# Patient Record
Sex: MALE | Race: WHITE | Employment: FULL TIME | URBAN - METROPOLITAN AREA
[De-identification: names, ages, dates, MRNs, and addresses within clinical notes are randomized per-mention and may not be internally consistent; named-entity substitution may affect disease eponyms.]

---

## 2017-04-24 ENCOUNTER — OFFICE VISIT (OUTPATIENT)
Dept: OPHTHALMOLOGY | Facility: CLINIC | Age: 62
End: 2017-04-24

## 2017-04-24 DIAGNOSIS — H40.009 GLAUCOMA SUSPECT, UNSPECIFIED LATERALITY: ICD-10-CM

## 2017-04-24 DIAGNOSIS — H52.13 MYOPIA, BILATERAL: Primary | ICD-10-CM

## 2017-04-24 ASSESSMENT — REFRACTION_MANIFEST
OS_CYLINDER: +0.50
OS_AXIS: 010
OD_CYLINDER: +1.00
OD_AXIS: 020
OD_SPHERE: -1.75
OS_SPHERE: -1.00

## 2017-04-24 ASSESSMENT — PACHYMETRY
OS_CT(UM): 560
OD_CT(UM): 561
EXAM_DATE: 2008

## 2017-04-24 ASSESSMENT — VISUAL ACUITY
OD_CC: 20/20
OD_SC: J1
CORRECTION_TYPE: GLASSES
OS_SC: J1
METHOD: SNELLEN - LINEAR
OS_CC: 20/15

## 2017-04-24 ASSESSMENT — EXTERNAL EXAM - LEFT EYE: OS_EXAM: NORMAL

## 2017-04-24 ASSESSMENT — REFRACTION_WEARINGRX
OD_SPHERE: -1.75
OS_SPHERE: -1.00
SPECS_TYPE: SVL
OS_AXIS: 009
OD_AXIS: 015
OS_CYLINDER: +0.50
OD_CYLINDER: +1.00

## 2017-04-24 ASSESSMENT — EXTERNAL EXAM - RIGHT EYE: OD_EXAM: NORMAL

## 2017-04-24 ASSESSMENT — CUP TO DISC RATIO
OS_RATIO: 0.65
OD_RATIO: 0.65

## 2017-04-24 ASSESSMENT — SLIT LAMP EXAM - LIDS
COMMENTS: NORMAL
COMMENTS: NORMAL

## 2017-04-24 ASSESSMENT — CONF VISUAL FIELD
OD_NORMAL: 1
OS_NORMAL: 1

## 2017-04-24 ASSESSMENT — TONOMETRY
IOP_METHOD: APPLANATION
OD_IOP_MMHG: 12
OS_IOP_MMHG: 12

## 2017-04-24 NOTE — NURSING NOTE
Chief Complaints and History of Present Illnesses   Patient presents with     COMPREHENSIVE EYE EXAM     Yearly visit     HPI    Affected eye(s):  Both   Symptoms:     No blurred vision   No difficulty with reading   No difficulty with driving   No eye discharge      Duration:  1 year   Frequency:  Constant       Do you have eye pain now?:  No      Comments:  Yearly routine exam.  No eye meds.  No concerns per pt.    Tamar DUNN 8:41 AM 04/24/2017

## 2017-04-24 NOTE — PROGRESS NOTES
Assessment & Plan      Andrew Pablo is a 61 year old male with the following diagnoses:   (H52.13) Myopia, bilateral  (primary encounter diagnosis)  Comment: No change  Plan: Copy Rx    (H40.009) Glaucoma suspect, unspecified laterality - Both Eyes  Comment: Stable  Plan: Follow     -----------------------------------------------------------------------------------      Patient disposition:   Return in about 6 months (around 10/24/2017) for OCT, Follow Up. or sooner as needed.    Complete documentation of historical and exam elements from today's encounter can  be found in the full encounter summary report (not reduplicated in this progress  note). I personally obtained the chief complaint(s) and history of present illness. I  confirmed and edited as necessary the review of systems, past medical/surgical  history, family history, social history, and examination findings as documented by  others; and I examined the patient myself. I personally reviewed the relevant tests,  images, and reports as documented above. I formulated and edited as necessary the  assessment and plan and discussed the findings and management plan with the  patient and family.    DIANNE Mccarthy M.D

## 2017-10-23 ENCOUNTER — OFFICE VISIT (OUTPATIENT)
Dept: OPHTHALMOLOGY | Facility: CLINIC | Age: 62
End: 2017-10-23

## 2017-10-23 DIAGNOSIS — H40.009 GLAUCOMA SUSPECT, UNSPECIFIED LATERALITY: Primary | ICD-10-CM

## 2017-10-23 ASSESSMENT — VISUAL ACUITY
OS_CC: 20/15
METHOD: SNELLEN - LINEAR
OD_CC: J1+
OD_CC: 20/15
OS_CC: J1+

## 2017-10-23 ASSESSMENT — CUP TO DISC RATIO
OD_RATIO: 0.65
OS_RATIO: 0.65

## 2017-10-23 ASSESSMENT — REFRACTION_WEARINGRX
OS_CYLINDER: +0.50
OD_CYLINDER: +1.00
OD_SPHERE: -1.75
OS_SPHERE: -1.00
SPECS_TYPE: SVL
OD_AXIS: 015
OS_AXIS: 009

## 2017-10-23 ASSESSMENT — SLIT LAMP EXAM - LIDS
COMMENTS: NORMAL
COMMENTS: NORMAL

## 2017-10-23 ASSESSMENT — TONOMETRY
IOP_METHOD: APPLANATION
OD_IOP_MMHG: 13
OS_IOP_MMHG: 13

## 2017-10-23 ASSESSMENT — CONF VISUAL FIELD
OD_NORMAL: 1
OS_NORMAL: 1

## 2017-10-23 ASSESSMENT — EXTERNAL EXAM - LEFT EYE: OS_EXAM: NORMAL

## 2017-10-23 ASSESSMENT — PACHYMETRY
OD_CT(UM): 561
EXAM_DATE: 2008
OS_CT(UM): 560

## 2017-10-23 ASSESSMENT — EXTERNAL EXAM - RIGHT EYE: OD_EXAM: NORMAL

## 2017-10-23 NOTE — NURSING NOTE
Chief Complaints and History of Present Illnesses   Patient presents with     Follow Up For     s/p Glaucoma suspect, unspecified laterality      HPI    Affected eye(s):  Both   Symptoms:     No blurred vision   No decreased vision   No floaters   No flashes      Duration:  6 months   Frequency:  Constant       Do you have eye pain now?:  No      Comments:  States va is the same since last visit vision stable over the last 6 months OU.  Mateo Ching  8:45 AM October 23, 2017

## 2017-10-23 NOTE — MR AVS SNAPSHOT
After Visit Summary   10/23/2017    Andrew Pablo    MRN: [id number]           Patient Information     Date Of Birth          1955        Visit Information        Provider Department      10/23/2017 8:40 AM Jose Antonio Mccarthy MD [facility]        Today's Diagnoses     Glaucoma suspect, unspecified laterality - Both Eyes    -  1       Follow-ups after your visit        Follow-up notes from your care team     Return in about 6 months (around 2018) for Complete Eye Exam.      Who to contact     Please call your clinic at [phone] to:    Ask questions about your health    Make or cancel appointments    Discuss your medicines    Learn about your test results    Speak to your doctor   If you have compliments or concerns about an experience at your clinic, or if you wish to file a complaint, please contact [facility] Patient Relations at [phone] or email us at [email or web address]         Additional Information About Your Visit        MyChart Information     [employer] is an electronic gateway that provides easy, online access to your medical records. With [employer], you can request a clinic appointment, read your test results, renew a prescription or communicate with your care team.     To sign up for [employer] visit the website at [email or web address]   You will be asked to enter the access code listed below, as well as some personal information. Please follow the directions to create your username and password.     Your access code is: [email or web address]  Expires: 2018  5:31 AM     Your access code will  in 90 days. If you need help or a new code, please contact your [facility] or call [phone] for assistance.        Care EveryWhere ID     This is your Care EveryWhere ID. This could be used by other organizations to access your [place] medical records  [id number]          Blood Pressure from Last 3 Encounters:   No data found for BP    Weight from Last 3 Encounters:   No data found for Wt              We Performed the Following     OCT Optic Nerve RNFL Spectralis OU (both eyes)        Primary Care Provider Office Phone # Fax #    Antoine Art [phone]       [facility] [address] [facility] [address]        Equal Access to Services     KANDACE CHASE : Hadii aad ku hadasho Soomaali, waaxda luqadaha, qaybta kaalmada adeegyada, waxanuj juddin hayaan adegregory lyonsdemiansridhar labertha . So [facility] [phone].    ATENCIÓN: Si habla español, tiene a dyer disposición servicios gratuitos de asistencia lingüística. Llame al [phone].    We comply with applicable federal civil rights laws and [place] laws. We do not discriminate on the basis of race, color, national origin, age, disability, sex, sexual orientation, or gender identity.            Thank you!     Thank you for choosing [facility]  for your care. Our goal is always to provide you with excellent care. Hearing back from our patients is one way we can continue to improve our services. Please take a few minutes to complete the written survey that you may receive in the mail after your visit with us. Thank you!             Your Updated Medication List - Protect others around you: Learn how to safely use, store and throw away your medicines at www.disposemymeds.org.          This list is accurate as of: 10/23/17 11:59 PM.  Always use your most recent med list.                   Brand Name Dispense Instructions for use Diagnosis    hydrochlorothiazide 12.5 MG capsule    MICROZIDE     Take 12.5 mg by mouth daily        LEVOTHYROXINE SODIUM PO           LISINOPRIL PO

## 2017-11-05 NOTE — PROGRESS NOTES
Assessment & Plan      Andrew Pablo is a 62 year old male with the following diagnoses:   (H40.009) Glaucoma suspect, unspecified laterality - Both Eyes  (primary encounter diagnosis)  Comment: Stable  Plan: OCT Optic Nerve RNFL Spectralis OU (both eyes)        Normal OU     -----------------------------------------------------------------------------------      Patient disposition:   Return in about 6 months (around 4/23/2018) for Complete Eye Exam. or sooner as needed.    Complete documentation of historical and exam elements from today's encounter can  be found in the full encounter summary report (not reduplicated in this progress  note). I personally obtained the chief complaint(s) and history of present illness. I  confirmed and edited as necessary the review of systems, past medical/surgical  history, family history, social history, and examination findings as documented by  others; and I examined the patient myself. I personally reviewed the relevant tests,  images, and reports as documented above. I formulated and edited as necessary the  assessment and plan and discussed the findings and management plan with the  patient and family.    DIANNE Mccarthy M.D

## 2018-04-09 ENCOUNTER — OFFICE VISIT (OUTPATIENT)
Dept: OPHTHALMOLOGY | Facility: CLINIC | Age: 63
End: 2018-04-09
Payer: COMMERCIAL

## 2018-04-09 DIAGNOSIS — H40.009 GLAUCOMA SUSPECT, UNSPECIFIED LATERALITY: Primary | ICD-10-CM

## 2018-04-09 DIAGNOSIS — B88.0 INFESTATION BY DEMODEX: ICD-10-CM

## 2018-04-09 DIAGNOSIS — H25.13 NUCLEAR SCLEROSIS OF BOTH EYES: ICD-10-CM

## 2018-04-09 RX ORDER — METOPROLOL TARTRATE 50 MG
75 TABLET ORAL
COMMUNITY
Start: 2018-01-04

## 2018-04-09 RX ORDER — LISINOPRIL AND HYDROCHLOROTHIAZIDE 20; 25 MG/1; MG/1
1 TABLET ORAL
COMMUNITY

## 2018-04-09 RX ORDER — ASPIRIN 325 MG
325 TABLET ORAL
COMMUNITY
End: 2018-04-09

## 2018-04-09 ASSESSMENT — TONOMETRY
IOP_METHOD: APPLANATION
OS_IOP_MMHG: 13
OD_IOP_MMHG: 14

## 2018-04-09 ASSESSMENT — EXTERNAL EXAM - RIGHT EYE: OD_EXAM: NORMAL

## 2018-04-09 ASSESSMENT — REFRACTION_WEARINGRX
OD_CYLINDER: +1.00
SPECS_TYPE: SVL
OS_AXIS: 010
OD_SPHERE: -1.75
OS_CYLINDER: +0.50
OS_SPHERE: -1.00
OD_AXIS: 020

## 2018-04-09 ASSESSMENT — VISUAL ACUITY
OS_CC: 20/20
METHOD_MR: NOT NEEDED
METHOD: SNELLEN - LINEAR
CORRECTION_TYPE: GLASSES
OD_CC: 20/20

## 2018-04-09 ASSESSMENT — CUP TO DISC RATIO
OD_RATIO: 0.7
OS_RATIO: 0.65

## 2018-04-09 ASSESSMENT — SLIT LAMP EXAM - LIDS
COMMENTS: NORMAL
COMMENTS: NORMAL

## 2018-04-09 ASSESSMENT — CONF VISUAL FIELD
OS_NORMAL: 1
OD_NORMAL: 1

## 2018-04-09 ASSESSMENT — EXTERNAL EXAM - LEFT EYE: OS_EXAM: NORMAL

## 2018-04-09 NOTE — NURSING NOTE
Chief Complaints and History of Present Illnesses   Patient presents with     Glaucoma Suspect Follow Up     HPI    Affected eye(s):  Both   Symptoms:        Duration:  6 months   Frequency:  Constant       Do you have eye pain now?:  No      Comments:  No eye medications including  OTC  No concerns or VA changes  Deanne DUNN 8:46 AM April 9, 2018

## 2018-04-09 NOTE — MR AVS SNAPSHOT
After Visit Summary   2018    Andrew Pablo    MRN: [id number]           Patient Information     Date Of Birth          1955        Visit Information        Provider Department      2018 8:30 AM Silver German, [facility]I [facility]        Today's Diagnoses     Glaucoma suspect, unspecified laterality - Both Eyes    -  1    Nuclear sclerosis of both eyes        Infestation by Demodex           Follow-ups after your visit        Follow-up notes from your care team     Return in about 1 year (around 2019) for Comprehensive Eye Exam.      Who to contact     Please call your clinic at [phone] to:    Ask questions about your health    Make or cancel appointments    Discuss your medicines    Learn about your test results    Speak to your doctor            Additional Information About Your Visit        MyChart Information     [employer] is an electronic gateway that provides easy, online access to your medical records. With [employer], you can request a clinic appointment, read your test results, renew a prescription or communicate with your care team.     To sign up for [employer] visit the website at [email or web address]   You will be asked to enter the access code listed below, as well as some personal information. Please follow the directions to create your username and password.     Your access code is: [email or web address]  Expires: 2018  6:31 AM     Your access code will  in 90 days. If you need help or a new code, please contact your [facility] or call [phone] for assistance.        Care EveryWhere ID     This is your Care EveryWhere ID. This could be used by other organizations to access your [place] medical records  [id number]         Blood Pressure from Last 3 Encounters:   No data found for BP    Weight from Last 3 Encounters:   No data found for Wt              Today, you had the following     No orders found  for display         Today's Medication Changes          These changes are accurate as of 4/9/18  9:17 AM.  If you have any questions, ask your nurse or doctor.               Stop taking these medicines if you haven't already. Please contact your care team if you have questions.     GOODSENSE ASPIRIN 325 MG tablet   Generic drug:  aspirin   Stopped by:  Silver German OD                    Primary Care Provider Office Phone # Fax #    Antoine Art [phone]       [place] [address] [address] [facility] [address]        Equal Access to Services     KANDACE CHASE : Hadii aad ku hadasho Soomaali, waaxda luqadaha, qaybta kaalmada adeegyada, waxay idiin hayaan adegregory kharash augustine . So [facility] [phone].    ATENCIÓN: Si habla español, tiene a dyer disposición servicios gratuitos de asistencia lingüística. [facility] [phone].    We comply with applicable federal civil rights laws and [place] laws. We do not discriminate on the basis of race, color, national origin, age, disability, sex, sexual orientation, or gender identity.            Thank you!     Thank you for choosing [facility]  for your care. Our goal is always to provide you with excellent care. Hearing back from our patients is one way we can continue to improve our services. Please take a few minutes to complete the written survey that you may receive in the mail after your visit with us. Thank you!             Your Updated Medication List - Protect others around you: Learn how to safely use, store and throw away your medicines at www.disposemymeds.org.          This list is accurate as of 4/9/18  9:17 AM.  Always use your most recent med list.                   Brand Name Dispense Instructions for use Diagnosis    FISH OIL PO      Take 2 capsules by mouth        LEVOTHYROXINE SODIUM PO           lisinopril-hydrochlorothiazide 20-25 MG per tablet    PRINZIDE/ZESTORETIC     Take 1 tablet by mouth        metoprolol  tartrate 50 MG tablet    LOPRESSOR     Take 75 mg by mouth        XARELTO PO

## 2018-04-09 NOTE — PROGRESS NOTES
Assessment/Plan  (H40.009) Glaucoma suspect, unspecified laterality - Both Eyes  (primary encounter diagnosis)  Comment: Appears stable to previous visits  Plan:  Educated patient on clinical findings. Repeat RNFL OCT at comprehensive eye exam in 1 year.    (H25.13) Nuclear sclerosis of both eyes  Comment: Not visually significant  Plan:  Monitor annually.    (B88.0) Infestation by Demodex  Comment: Asymptomatic  Plan:  Discussed lid hygiene prn.    Return to clinic in 1 year for comprehensive eye exam and RNFL OCT.    Complete documentation of historical and exam elements from today's encounter can  be found in the full encounter summary report (not reduplicated in this progress  note). I personally obtained the chief complaint(s) and history of present illness. I  confirmed and edited as necessary the review of systems, past medical/surgical  history, family history, social history, and examination findings as documented by  others; and I examined the patient myself. I personally reviewed the relevant tests,  images, and reports as documented above. I formulated and edited as necessary the  assessment and plan and discussed the findings and management plan with the  patient and family.    Silver German, OD, FAAO

## 2019-04-23 ENCOUNTER — OFFICE VISIT (OUTPATIENT)
Dept: OPHTHALMOLOGY | Facility: CLINIC | Age: 64
End: 2019-04-23
Payer: COMMERCIAL

## 2019-04-23 DIAGNOSIS — H52.13 MYOPIA, BILATERAL: Primary | ICD-10-CM

## 2019-04-23 DIAGNOSIS — H40.003 GLAUCOMA SUSPECT OF BOTH EYES: ICD-10-CM

## 2019-04-23 DIAGNOSIS — H25.13 NUCLEAR SCLEROTIC CATARACT OF BOTH EYES: ICD-10-CM

## 2019-04-23 PROBLEM — R06.09 DYSPNEA ON EXERTION: Status: ACTIVE | Noted: 2019-03-07

## 2019-04-23 PROBLEM — Z01.00 ENCOUNTER FOR VISUAL TESTING: Status: ACTIVE | Noted: 2018-04-04

## 2019-04-23 PROBLEM — J45.901 ASTHMA EXACERBATION: Status: ACTIVE | Noted: 2018-01-01

## 2019-04-23 PROBLEM — J18.9 CAP (COMMUNITY ACQUIRED PNEUMONIA): Status: ACTIVE | Noted: 2018-01-01

## 2019-04-23 PROBLEM — I50.9 CONGESTIVE HEART FAILURE (H): Status: ACTIVE | Noted: 2018-01-17

## 2019-04-23 PROBLEM — I42.9 CARDIOMYOPATHY (H): Status: ACTIVE | Noted: 2018-02-23

## 2019-04-23 PROBLEM — I48.0 PAROXYSMAL ATRIAL FIBRILLATION (H): Status: ACTIVE | Noted: 2017-11-21

## 2019-04-23 PROBLEM — G47.33 OBSTRUCTIVE SLEEP APNEA SYNDROME: Status: ACTIVE | Noted: 2018-05-17

## 2019-04-23 PROBLEM — Z91.81 HISTORY OF FALLING: Status: ACTIVE | Noted: 2018-05-17

## 2019-04-23 RX ORDER — ALBUTEROL SULFATE 90 UG/1
2 AEROSOL, METERED RESPIRATORY (INHALATION)
COMMUNITY
Start: 2012-12-20 | End: 2020-03-06

## 2019-04-23 RX ORDER — FLUTICASONE PROPIONATE 220 UG/1
2 AEROSOL, METERED RESPIRATORY (INHALATION)
COMMUNITY
Start: 2012-12-20 | End: 2020-03-06

## 2019-04-23 RX ORDER — DIPHENOXYLATE HYDROCHLORIDE AND ATROPINE SULFATE 2.5; .025 MG/1; MG/1
1 TABLET ORAL
COMMUNITY
Start: 2010-04-09

## 2019-04-23 RX ORDER — SIMVASTATIN 10 MG
10 TABLET ORAL
COMMUNITY
Start: 2012-12-20 | End: 2020-03-06

## 2019-04-23 ASSESSMENT — REFRACTION_WEARINGRX
OD_SPHERE: -1.75
OS_SPHERE: -1.00
SPECS_TYPE: SVL
OS_CYLINDER: +0.50
OD_AXIS: 020
OD_CYLINDER: +1.00
OS_AXIS: 010

## 2019-04-23 ASSESSMENT — VISUAL ACUITY
METHOD: SNELLEN - LINEAR
OS_CC: 20/20
OD_CC: 20/25

## 2019-04-23 ASSESSMENT — SLIT LAMP EXAM - LIDS
COMMENTS: NORMAL
COMMENTS: NORMAL

## 2019-04-23 ASSESSMENT — CUP TO DISC RATIO
OS_RATIO: 0.65
OD_RATIO: 0.7

## 2019-04-23 ASSESSMENT — PACHYMETRY
OD_CT(UM): 561
EXAM_DATE: 2008
OS_CT(UM): 560

## 2019-04-23 ASSESSMENT — REFRACTION_MANIFEST
OS_CYLINDER: +0.50
OD_AXIS: 005
OS_AXIS: 005
OD_SPHERE: -2.00
OD_CYLINDER: +1.00
OS_SPHERE: -1.00

## 2019-04-23 ASSESSMENT — EXTERNAL EXAM - RIGHT EYE: OD_EXAM: NORMAL

## 2019-04-23 ASSESSMENT — TONOMETRY
IOP_METHOD: ICARE
OD_IOP_MMHG: 14
OS_IOP_MMHG: 16

## 2019-04-23 ASSESSMENT — EXTERNAL EXAM - LEFT EYE: OS_EXAM: NORMAL

## 2019-04-23 ASSESSMENT — CONF VISUAL FIELD
OS_NORMAL: 1
OD_NORMAL: 1

## 2019-04-23 NOTE — PROGRESS NOTES
HPI  Andrew Pablo is a 63 year old male here for comprehensive eye exam.  Has good vision both eyes with current glasses.  Denies eye pain or irritation.       PMH: afib on xarelto, hyperlipidemia, hypothryoidism  POH: Glasses for myopia, no surgery, no trauma  Oc Meds: none  FH: Denies any glaucoma, age related macular degeneration, or other known eye diseases         Assessment & Plan      (H52.13) Myopia, bilateral - Both Eyes  (primary encounter diagnosis)  Comment: minimal change  Plan: manifest refraction done and prescription for glasses given to fill lprn     (H40.003) Glaucoma suspect of both eyes - Both Eyes  Comment: larger c/d both eyes normal oct for several years with normal intraocular pressure negative family history   Plan: OCT Optic Nerve RNFL Spectralis OU (both eyes)- normal stable both eyes         Follow annually     (H25.13) Nuclear sclerotic cataract of both eyes - Both Eyes  Comment: mild not visually significant   Plan: follow  -----------------------------------------------------------------------------------       Patient disposition:   Return in about 1 year (around 4/23/2020) for  Comprehensive Exam, glaucoma suspect , OCT nerve OU. or patient to call sooner as needed.      Complete documentation of historical and exam elements from today's encounter can be found in the full encounter summary report (not reduplicated in this progress note). I personally obtained the chief complaint(s) and history of present illness.  I have confirmed and edited as necessary the CC, HPI, PMH/PSH, social history, FMH, ROS, and exam/neuro findings as obtained by the technician or others. I have examined this patient myself and I personally viewed the image(s) and studies listed above and the documentation reflects my findings and interpretation.  I formulated and edited as necessary the assessment and plan and discussed the findings and management plan with the patient and family.     Lynne  MD Faisal

## 2020-05-11 LAB — EJECTION FRACTION: 50 %

## 2020-05-13 ENCOUNTER — TRANSFERRED RECORDS (OUTPATIENT)
Dept: HEALTH INFORMATION MANAGEMENT | Facility: CLINIC | Age: 65
End: 2020-05-13

## 2020-08-27 ENCOUNTER — DOCUMENTATION ONLY (OUTPATIENT)
Dept: OTHER | Facility: CLINIC | Age: 65
End: 2020-08-27

## 2021-12-13 ENCOUNTER — THERAPY VISIT (OUTPATIENT)
Dept: PHYSICAL THERAPY | Facility: CLINIC | Age: 66
End: 2021-12-13
Payer: COMMERCIAL

## 2021-12-13 DIAGNOSIS — M75.122 NONTRAUMATIC COMPLETE TEAR OF LEFT ROTATOR CUFF: ICD-10-CM

## 2021-12-13 DIAGNOSIS — Z47.89 AFTERCARE FOLLOWING SURGERY OF THE MUSCULOSKELETAL SYSTEM, NEC: ICD-10-CM

## 2021-12-13 PROCEDURE — 97110 THERAPEUTIC EXERCISES: CPT | Mod: GP | Performed by: PHYSICAL THERAPIST

## 2021-12-13 PROCEDURE — 97112 NEUROMUSCULAR REEDUCATION: CPT | Mod: GP | Performed by: PHYSICAL THERAPIST

## 2021-12-13 PROCEDURE — 97161 PT EVAL LOW COMPLEX 20 MIN: CPT | Mod: GP | Performed by: PHYSICAL THERAPIST

## 2021-12-13 NOTE — PROGRESS NOTES
Physical Therapy Initial Evaluation  Subjective:  The history is provided by the patient. No  was used.   Therapist Generated HPI Evaluation  Problem details: 12/9/21 pt returned to [facility]. 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 S/p left shoulder arthroscopic rotator cuff repair utilizing rip-stop tech, AC joint rescection, SAD, extensive debridement & mini open subpectoralis biceps transplantation DOS: 7/1/21 by Dr. Da Silva.  Injection on 12/9/21 with significant pain relief.  An MRI has been ordered.  Pt is R handed..         Type of problem:  Left shoulder.    This is a chronic condition.  Condition occurred with:  Other.  Where condition occurred: other.  Patient reports pain:  Anterior.  Pain is described as sharp and is intermittent.  Pain is worse during the day.  Since onset symptoms are unchanged.  Associated symptoms:  Loss of strength. Symptoms are exacerbated by lifting, using arm at shoulder level, using arm overhead and using arm behind back (golf, fish, tennis)  and relieved by ice and rest.  Special tests included:  MRI and x-ray.  Previous treatment includes physical therapy. There was moderate improvement following previous treatment.  Restrictions due to condition include:  Working in normal job without restrictions.  Barriers include:  None as reported by patient.    Patient Health History  Andrew Pablo being seen for L shoulder.     Problem began: 7/1/2021.   Problem occurred: surgery   Pain score: 0-8/10.  General health as reported by patient is good.  Pertinent medical history includes: asthma, chemical dependency, high blood pressure, implanted device, overweight and cancer.   Red flags:  None as reported by patient.  Medical allergies: none.   Surgeries include:  Orthopedic surgery and cancer surgery. Other surgery history details: L shoulder, L TKA, L ankle, lumbar; skin CA.     Current medications:  High blood pressure medication, other and thyroid medication. Other medications details: blood thinner, .    Current occupation is real estate consulting.   Primary job tasks include:  Computer work and prolonged sitting.                                    Objective:  Standing Alignment:      Shoulder/UE:  Rounded shoulders and scapular downward rotation L                  Flexibility/Screens:     Upper Extremity:    Decreased left upper extremity flexibility at:  Pectoralis Minor    Decreased right upper extremity flexibility present at:  Pectoralis Minor                           Shoulder Evaluation:  ROM:  AROM:    Flexion:  Left:  63    Right:  161    Abduction:  Left: 52   Right:  160      External Rotation:  Left:  30    Right:  60          Flexion/External Rotation:  Left:  C3    Right:  T3  Extension/Internal Rotation:  Left:  T10    Right:  T8    PROM:  : mild stretch with ff, abd, and ER; mod stretch with ER/abd.  Flexion:  Left:  ~130          Abduction:  Left:  101         Internal Rotation:  Left:  81      External Rotation:  Left:  45 at side and 42 in abd                            Special Tests:    Left shoulder positive for the following special tests:  Rotator cuff tear (+ drop arm and sig weakness to ER; neg belly press)    Palpation:  normal                                         General     ROS    Assessment/Plan:    Patient is a 66 year old male with left side shoulder complaints.    Patient has the following significant findings with corresponding treatment plan.                Diagnosis 1:  S/p left shoulder arthroscopic rotator cuff repair utilizing rip-stop tech, AC joint rescection, SAD, extensive debridement & mini open subpectoralis biceps transplantation  Pain -  hot/cold therapy and home program  Decreased ROM/flexibility - manual therapy and therapeutic exercise  Decreased strength - therapeutic exercise and therapeutic activities  Decreased proprioception -  neuro re-education and therapeutic activities  Inflammation - cold therapy and self management/home program  Impaired muscle performance - neuro re-education  Decreased function - therapeutic activities  Impaired posture - neuro re-education    Therapy Evaluation Codes:   1) History comprised of:   Personal factors that impact the plan of care:      Time since onset of symptoms.    Comorbidity factors that impact the plan of care are:      Asthma, Cancer, Chemical Dependency, High blood pressure, Implanted device and Overweight.     Medications impacting care: High blood pressure and thyroid, blood thinner.  2) Examination of Body Systems comprised of:   Body structures and functions that impact the plan of care:      Shoulder.   Activity limitations that impact the plan of care are:      Bathing, Cooking, Driving, Dressing, Lifting, Sports and reaching.  3) Clinical presentation characteristics are:   Stable/Uncomplicated.  4) Decision-Making    Low complexity using standardized patient assessment instrument and/or measureable assessment of functional outcome.  Cumulative Therapy Evaluation is: Low complexity.    Previous and current functional limitations:  (See Goal Flow Sheet for this information)    Short term and Long term goals: (See Goal Flow Sheet for this information)     Communication ability:  Patient appears to be able to clearly communicate and understand verbal and written communication and follow directions correctly.  Treatment Explanation - The following has been discussed with the patient:   RX ordered/plan of care  Anticipated outcomes  Possible risks and side effects  This patient would benefit from PT intervention to resume normal activities.   Rehab potential is good.    Frequency:  1 X week, once daily  Duration:  for 8 weeks  Discharge Plan:  Achieve all LTG.  Independent in home treatment program.  Reach maximal therapeutic benefit.    Please refer to the daily flowsheet for treatment  today, total treatment time and time spent performing 1:1 timed codes.

## 2021-12-22 ENCOUNTER — THERAPY VISIT (OUTPATIENT)
Dept: PHYSICAL THERAPY | Facility: CLINIC | Age: 66
End: 2021-12-22
Payer: COMMERCIAL

## 2021-12-22 DIAGNOSIS — M75.122 NONTRAUMATIC COMPLETE TEAR OF LEFT ROTATOR CUFF: ICD-10-CM

## 2021-12-22 DIAGNOSIS — Z47.89 AFTERCARE FOLLOWING SURGERY OF THE MUSCULOSKELETAL SYSTEM, NEC: ICD-10-CM

## 2021-12-22 PROCEDURE — 97110 THERAPEUTIC EXERCISES: CPT | Mod: GP | Performed by: PHYSICAL THERAPIST

## 2021-12-22 PROCEDURE — 97112 NEUROMUSCULAR REEDUCATION: CPT | Mod: GP | Performed by: PHYSICAL THERAPIST

## 2022-01-03 ENCOUNTER — THERAPY VISIT (OUTPATIENT)
Dept: PHYSICAL THERAPY | Facility: CLINIC | Age: 67
End: 2022-01-03
Payer: COMMERCIAL

## 2022-01-03 DIAGNOSIS — Z47.89 AFTERCARE FOLLOWING SURGERY OF THE MUSCULOSKELETAL SYSTEM, NEC: ICD-10-CM

## 2022-01-03 DIAGNOSIS — M75.122 NONTRAUMATIC COMPLETE TEAR OF LEFT ROTATOR CUFF: ICD-10-CM

## 2022-01-03 PROCEDURE — 97110 THERAPEUTIC EXERCISES: CPT | Mod: GP | Performed by: PHYSICAL THERAPIST

## 2022-01-03 PROCEDURE — 97112 NEUROMUSCULAR REEDUCATION: CPT | Mod: GP | Performed by: PHYSICAL THERAPIST

## 2022-01-10 ENCOUNTER — THERAPY VISIT (OUTPATIENT)
Dept: PHYSICAL THERAPY | Facility: CLINIC | Age: 67
End: 2022-01-10
Payer: COMMERCIAL

## 2022-01-10 DIAGNOSIS — M75.122 NONTRAUMATIC COMPLETE TEAR OF LEFT ROTATOR CUFF: ICD-10-CM

## 2022-01-10 DIAGNOSIS — Z47.89 AFTERCARE FOLLOWING SURGERY OF THE MUSCULOSKELETAL SYSTEM, NEC: ICD-10-CM

## 2022-01-10 PROCEDURE — 97110 THERAPEUTIC EXERCISES: CPT | Mod: GP | Performed by: PHYSICAL THERAPIST

## 2022-01-10 PROCEDURE — 97112 NEUROMUSCULAR REEDUCATION: CPT | Mod: GP | Performed by: PHYSICAL THERAPIST

## 2022-01-17 ENCOUNTER — THERAPY VISIT (OUTPATIENT)
Dept: PHYSICAL THERAPY | Facility: CLINIC | Age: 67
End: 2022-01-17
Payer: COMMERCIAL

## 2022-01-17 DIAGNOSIS — Z47.89 AFTERCARE FOLLOWING SURGERY OF THE MUSCULOSKELETAL SYSTEM, NEC: ICD-10-CM

## 2022-01-17 DIAGNOSIS — M75.122 NONTRAUMATIC COMPLETE TEAR OF LEFT ROTATOR CUFF: ICD-10-CM

## 2022-01-17 PROCEDURE — 97112 NEUROMUSCULAR REEDUCATION: CPT | Mod: GP | Performed by: PHYSICAL THERAPIST

## 2022-01-17 PROCEDURE — 97110 THERAPEUTIC EXERCISES: CPT | Mod: GP | Performed by: PHYSICAL THERAPIST

## 2022-01-17 NOTE — PROGRESS NOTES
Subjective:  HPI  Physical Exam                    Objective:  System    Physical Exam    General     ROS    Assessment/Plan:    PROGRESS  REPORT    Progress reporting period is from 12/13/21 to 1/17/22.       SUBJECTIVE  Subjective changes noted by patient:  Motion is improving but Ion is still frustrated with continued weakness and pain.  He was hoping to golf in [place] and did go to the range and was unable to get a full swing.       Current pain level is 0-7/10  .      Initial Pain level:  (0-8/10).   Changes in function:  Yes (See Goal flowsheet attached for changes in current functional level)  Adverse reaction to treatment or activity: None    OBJECTIVE  -L shoulder AROM: flex 130, abd 78, ER 25, IR/ext T9, ER/abd C3  -PROM: flex 147, abd 142, ER at side 26 (pain), ER in abd 40, IR 85  -Tolerating supine RC program with 10 oz and SL pendulums with no wt.  HEP also focusing on AAROM and basic scap stab        ASSESSMENT/PLAN  Updated problem list and treatment plan: Diagnosis 1:  S/p left shoulder arthroscopic rotator cuff repair utilizing rip-stop tech, AC joint rescection, SAD, extensive debridement & mini open subpectoralis biceps transplantation 7/1/21  Pain -  hot/cold therapy and home program  Decreased ROM/flexibility - manual therapy and therapeutic exercise  Decreased joint mobility - manual therapy and therapeutic exercise  Decreased strength - therapeutic exercise and therapeutic activities  Decreased proprioception - neuro re-education and therapeutic activities  Inflammation - cold therapy and self management/home program  Impaired muscle performance - neuro re-education  Decreased function - therapeutic activities  Impaired posture - neuro re-education  STG/LTGs have been met or progress has been made towards goals:  Yes (See Goal flow sheet completed today.)  Assessment of Progress: The patient's condition is improving.  Self Management Plans:  Patient has been instructed in a home  treatment program.  Patient  has been instructed in self management of symptoms.  I have re-evaluated this patient and find that the nature, scope, duration and intensity of the therapy is appropriate for the medical condition of the patient.  Andrew continues to require the following intervention to meet STG and LTG's:  PT    Recommendations:  Pt returns to MD tomorrow.  The need/frequency of PT will be decided at that time.    Please refer to the daily flowsheet for treatment today, total treatment time and time spent performing 1:1 timed codes.

## 2022-01-31 ENCOUNTER — THERAPY VISIT (OUTPATIENT)
Dept: PHYSICAL THERAPY | Facility: CLINIC | Age: 67
End: 2022-01-31
Payer: COMMERCIAL

## 2022-01-31 DIAGNOSIS — Z47.89 AFTERCARE FOLLOWING SURGERY OF THE MUSCULOSKELETAL SYSTEM, NEC: ICD-10-CM

## 2022-01-31 DIAGNOSIS — M75.122 NONTRAUMATIC COMPLETE TEAR OF LEFT ROTATOR CUFF: ICD-10-CM

## 2022-01-31 PROCEDURE — 97110 THERAPEUTIC EXERCISES: CPT | Mod: GP | Performed by: PHYSICAL THERAPIST

## 2022-01-31 PROCEDURE — 97530 THERAPEUTIC ACTIVITIES: CPT | Mod: GP | Performed by: PHYSICAL THERAPIST

## 2022-01-31 NOTE — LETTER
JOHNATH[facility]  [address] [address] [place] [address]  [phone]    2022    Re: Andrew Pablo   :   1955  MRN:  [id number]   REFERRING PHYSICIAN:   Willis MANN [facility]  Date of Initial Evaluation:  2021  Visits:  Rxs Used: 6  Reason for Referral:     Nontraumatic complete tear of left rotator cuff  Aftercare following surgery of the musculoskeletal system, NEC    Assessment/Plan:    DISCHARGE REPORT    Progress reporting period is from 22 to 22.       SUBJECTIVE  Subjective changes noted by patient:  MD sangeetha and he will need a reverse TSA.  Ion was able to golf 2 rounds of golf on vacation with some difficulty.  Current pain level is 0-7/10  .    Initial Pain level:  (0-8/10).   Changes in function:  Yes (See Goal flowsheet attached for changes in current functional level)  Adverse reaction to treatment or activity: None    OBJECTIVE  No changes since PN on 22     ASSESSMENT/PLAN  Updated problem list and treatment plan: Diagnosis 1:  L shoulder  Pain -  hot/cold therapy and home program  Decreased ROM/flexibility - manual therapy and therapeutic exercise  Decreased strength - therapeutic exercise and therapeutic activities  Decreased proprioception - neuro re-education and therapeutic activities  Inflammation - cold therapy and self management/home program  Impaired muscle performance - neuro re-education  Decreased function - therapeutic activities  Impaired posture - neuro re-education  STG/LTGs have been met or progress has been made towards goals:  Yes (See Goal flow sheet completed today.)  Assessment of Progress: The patient's progress has plateaued.  Self Management Plans:  Patient is independent in a home treatment program.  Patient is independent in self management of symptoms.  I have re-evaluated this patient and find that the nature, scope,  duration and intensity of the therapy is appropriate for the medical condition of the patient.  Andrew continues to require the following intervention to meet STG and LTG's:  PT intervention is no longer required to meet STG/LTG.  Re: Andrew Avinajeffdede   :   1955    Recommendations:  This patient is ready to be discharged from therapy and continue their home treatment program.    Thank you for your referral.    INQUIRIES  Therapist: Maggie Dozier PT  [address] [address]  Phone: [phone]  Fax: [phone]

## 2022-01-31 NOTE — PROGRESS NOTES
Subjective:  HPI  Physical Exam                    Objective:  System    Physical Exam    General     ROS    Assessment/Plan:    DISCHARGE REPORT    Progress reporting period is from 1/17/22 to 1/31/22.       SUBJECTIVE  Subjective changes noted by patient:  MD vivas and he will need a reverse TSA.  Ion was able to golf 2 rounds of golf on vacation with some difficulty.  Current pain level is 0-7/10  .    Initial Pain level:  (0-8/10).   Changes in function:  Yes (See Goal flowsheet attached for changes in current functional level)  Adverse reaction to treatment or activity: None    OBJECTIVE  No changes since PN on 1/17/22        ASSESSMENT/PLAN  Updated problem list and treatment plan: Diagnosis 1:  L shoulder  Pain -  hot/cold therapy and home program  Decreased ROM/flexibility - manual therapy and therapeutic exercise  Decreased strength - therapeutic exercise and therapeutic activities  Decreased proprioception - neuro re-education and therapeutic activities  Inflammation - cold therapy and self management/home program  Impaired muscle performance - neuro re-education  Decreased function - therapeutic activities  Impaired posture - neuro re-education  STG/LTGs have been met or progress has been made towards goals:  Yes (See Goal flow sheet completed today.)  Assessment of Progress: The patient's progress has plateaued.  Self Management Plans:  Patient is independent in a home treatment program.  Patient is independent in self management of symptoms.  I have re-evaluated this patient and find that the nature, scope, duration and intensity of the therapy is appropriate for the medical condition of the patient.  Andrew continues to require the following intervention to meet STG and LTG's:  PT intervention is no longer required to meet STG/LTG.    Recommendations:  This patient is ready to be discharged from therapy and continue their home treatment program.    Please refer to the daily flowsheet for treatment  today, total treatment time and time spent performing 1:1 timed codes.

## 2025-06-18 NOTE — MR AVS SNAPSHOT
After Visit Summary   2017    Andrew Pablo    MRN: [id number]           Patient Information     Date Of Birth          1955        Visit Information        Provider Department      2017 8:20 AM Jose Antonio Mccarthy MD [facility]        Today's Diagnoses     Myopia, bilateral    -  1    Glaucoma suspect, unspecified laterality - Both Eyes           Follow-ups after your visit        Follow-up notes from your care team     Return in about 6 months (around 10/24/2017) for OCT, Follow Up.      Who to contact     Please call your clinic at [phone] to:    Ask questions about your health    Make or cancel appointments    Discuss your medicines    Learn about your test results    Speak to your doctor   If you have compliments or concerns about an experience at your clinic, or if you wish to file a complaint, please contact [facility] Patient Relations at [phone] or email us at [email or web address]         Additional Information About Your Visit        MyChart Information     [employer] is an electronic gateway that provides easy, online access to your medical records. With [employer], you can request a clinic appointment, read your test results, renew a prescription or communicate with your care team.     To sign up for [employer] visit the website at [email or web address]   You will be asked to enter the access code listed below, as well as some personal information. Please follow the directions to create your username and password.     Your access code is: [email or web address]  Expires: 2017  9:10 AM     Your access code will  in 90 days. If you need help or a new code, please contact your [facility] or call [phone] for assistance.        Care EveryWhere ID     This is your Care EveryWhere ID. This could be used by other organizations to access your [facility]  records  [id number]         Blood Pressure from Last 3 Encounters:   No data found for BP    Weight from Last 3 Encounters:   No data found for Wt              Today, you had the following     No orders found for display       Primary Care Provider Office Phone # Fax #    Antoine Art [phone]       [facility] [address] [facility] [address]        Thank you!     Thank you for choosing [facility]  for your care. Our goal is always to provide you with excellent care. Hearing back from our patients is one way we can continue to improve our services. Please take a few minutes to complete the written survey that you may receive in the mail after your visit with us. Thank you!             Your Updated Medication List - Protect others around you: Learn how to safely use, store and throw away your medicines at www.disposemymeds.org.          This list is accurate as of: 4/24/17  9:11 AM.  Always use your most recent med list.                   Brand Name Dispense Instructions for use    hydrochlorothiazide 12.5 MG capsule    MICROZIDE     Take 12.5 mg by mouth daily       LEVOTHYROXINE SODIUM PO          LISINOPRIL PO             no No